# Patient Record
Sex: MALE | Race: WHITE
[De-identification: names, ages, dates, MRNs, and addresses within clinical notes are randomized per-mention and may not be internally consistent; named-entity substitution may affect disease eponyms.]

---

## 2018-01-19 NOTE — HP
ATTENDING PHYSICIAN:  Dr. Tatum.

 

CONSULTING PHYSICIAN:  Dr. Fredy Shook.

 

DATE OF SERVICE:  01/19/2018

 

CHIEF COMPLAINT:  Evaluation of right knee pain.

 

HISTORY OF PRESENT ILLNESS:  Mr. Greenberg is a 77-year-old male, who fell onto 
his right knee while seated on the commode this morning.  He has a history of 
multiple CVAs and has profound right-sided weakness.  He has a history of falls 
and injury to his right leg including previous fractures as a result. His wife, 
who was helping him toilet, witnessed the fall.  He denies hitting his head or 
suffering any loss of consciousness.  He took a hydrocodone tablet at home and 
then was given morphine in the ED.  He reports that his pain is currently 6/10.
  He describes the pain as constant and sharp.  It is confined to the knee and 
does not radiate.  He has no other complaints.

 

PAST MEDICAL HISTORY:  The patient reports having two strokes, one in 2009 and 
one in 2013.

 

PAST MEDICAL HISTORY:  Also significant for hypertension, hyperlipidemia, 
diabetes, depression, history of polio, history of chronic kidney disease, and 
history of primary pulmonary hypertension.

 

PAST SURGICAL HISTORY:  Significant for appendectomy in the 1980s.

 

FAMILY HISTORY:  Sister, coronary artery disease.  Mother, coronary artery 
disease.  Father, coronary artery disease.

 

SOCIAL HISTORY:  He does not smoke.  He does not drink alcohol.  He does not do 
drugs.

 

ALLERGIES:  No known drug allergies.

 

REVIEW OF SYSTEMS:  Ten-point review of systems negative except as mentioned in 
the HPI.

 

CURRENT MEDICATIONS:  Actos 45 mg p.o. once daily, amlodipine 10 mg p.o. daily, 
fluoxetine 10 mg p.o. daily, pravastatin 40 mg p.o. daily, lisinopril 10 mg 
p.o. daily.

 

OBJECTIVE:

VITAL SIGNS:  /96, pulse 69, respirations 18, temperature 97.7, and O2 
sat 97% on room air.  Repeat vital signs:  As follows, /75, pulse 65, 
respirations 18, O2 sat 97% on room air.

GENERAL:  Elderly male lying in bed.  He does not appear in pain.  He appears 
nontoxic.

HEENT:  Normocephalic, atraumatic.  Eyes Pupils equal, round, and reactive to 
light and accommodation.  Extraocular movements intact.  Ears atraumatic.  
External auditory canals are clear.  Nose:  Nares are patent and free of blood.
  Mouth:  Oropharynx is clear.

NECK:  Trachea is midline.  He has no tenderness.

RESPIRATORY:  Clear to auscultation bilaterally.

CARDIOVASCULAR:  He has a grade out 2/6 systolic murmur heard best on the left 
sternal border.  He has a regular rate and rhythm.  His distal pulses are 2+ 
bilaterally.

MUSCULOSKELETAL:  His  strength and great toe strength are 5/5 on his left 
side.  Strength 1/5 on the right.

EXTREMITIES:  His right knee shows gross effusion.  No erythema, no warmth.  
General tenderness to palpation.

SKIN:  Warm and dry and well perfused.  There are scattered 1-2 mm abrasions 
across his left knee and distal right lower extremity.

NEUROLOGIC:  He is alert and oriented x4.  His [place] coma score is 15.  He 
denies numbness or tingling in his extremities.

PSYCHIATRIC:  His mood and affect are appropriate.

 

RADIOLOGIC FINDINGS:  Right knee 3 view x-ray shows comminuted intra-articular 
fracture of the distal femur.  He also has an acute lipohemarthrosis as well as 
osteoporosis and atherosclerosis that are longstanding.

 

ASSESSMENT AND PLAN:

1.  Status post ground level fall.

2.  Comminuted distal femur fracture.

3.  Hypertension.

4.  Diabetes.

5.  Hyperlipidemia.

6.  Depression.

7.  History of stroke.

 

PLAN:  He has seen Dr. Shook from ortho surgery who recommends nonsurgical 
management, given that limb is not weightbearing.  The patient reports that he 
has a hinged knee brace that he is worn with prior fractures and Dr. Shook 
has recommended that he wear this.  We will admit him for pain management 
tonight and then transfer him to rehab tomorrow.  PT and OT consult will be 
placed as well as a rehab evaluation.  The patient has been seen and examined 
along with Dr. Tatum, who agrees with the assessment and plan.

 

ERNIE

## 2018-01-19 NOTE — PRG
DATE OF SERVICE:  01/19/2018

 

SUBJECTIVE:  This is a 77-year-old male status post fall with distal femur fracture.  Per orthopedic 
surgery, fracture can be treated nonoperatively.  An attempt was made to discharge the patient from [facility] emergency room to inpatient rehabilitation, but there are no beds.  The patient was admitted to [facility] for pain control, PT and OT and rehab consult.  Upon my evaluation, he vocalized no compla
int.

 

OBJECTIVE:

VITAL SIGNS:  Reviewed and stable.

GENERAL:  The patient is resting in bed in no acute distress.  Breathing is nonlabored.

 

ASSESSMENT AND PLAN:  As documented in history and physical.  Continue care as ordered.  Continue to 
monitor.

## 2018-01-19 NOTE — RAD
RIGHT KNEE THREE VIEWS:

 

HISTORY:

Fall.  Right knee injury.

 

FINDINGS:

Comminuted, predominantly oblique fracture through the medial aspect of the distal femur extends from
 the medial metaphysis to the intercondylar fossa.  There is 1.2 cm posterior medial displacement of 
the major fragment.  There is angulation of the proximal tibia present with the appearance of an acut
e impaction fracture involving the medial tibial metaphysis.  No tibial plateau fracture is apparent.
  No fibular head fracture is reliably demonstrated.  Fat and fluid distend the suprapatellar bursa. 
 Osseous structures are markedly demineralized.  There is calcification in the arterial structures.

 

IMPRESSION:

1.  Comminuted intraarticular fracture of the distal femur, as detailed above.  Resultant lipohemarth
rosis.

 

2.  Osteoporosis.

 

3.  Atherosclerosis.

 

POS: [facility]

## 2018-01-19 NOTE — CON
DATE OF CONSULTATION:  01/19/2018

 

REQUESTING PHYSICIAN:  Dr. Jamal Tatum.

 

BRIEF HISTORY OF PRESENT ILLNESS:  The patient is a 77-year-old gentleman with a history of prior str
trang and right-sided paralysis.  The patient also has a history of hypertension, diabetes, hyperlipide
tali.  He has had multiple falls in the past with a hip fracture in May of last year, which resulted i
n a healed fracture with nonsurgical management.  Earlier today, he fell from a toilet and sustained 
injury again to this right lower extremity.  Upon arrival at [facility], x-rays were obtaine
d and showed a medial distal femoral condyle fracture that extends into the joint surface with mild d
isplacement.  Patient also has evidence of a healing older tibial plateau fracture.  With this new in
jury, orthopedic consultation requested.  Patient reports the pain level is 7/10.

 

PAST MEDICAL HISTORY:  Remarkable for cerebrovascular accident in 07/2013, hypertension, diabetes, re
nal insufficiency, and gout.

 

PAST SURGICAL HISTORY:  Includes appendectomy.

 

SOCIAL HISTORY:  The patient denies alcohol or drug use and does not have a history of smoking.

 

REVIEW OF SYSTEMS:  Denies recent fevers, chills, or sweats.  Denies current chest pain or shortness 
of breath.  He does have some mild stocking dysesthesias distally.

 

ALLERGIES:  None known.

 

MEDICATIONS:  Per his hospital record, remarkable for pravastatin, metoprolol, Norvasc, Actos, aspiri
n daily.

 

PHYSICAL EXAMINATION:

HEENT:  Poor dentition, but otherwise atraumatic.

HEART:  Shows a regular rate and rhythm without murmur.

LUNGS:  Clear to auscultation with good breath sounds.

ABDOMEN:  Benign.

EXTREMITIES:  Remarkable for right lower extremity with swelling at the knee with the knee held in fl
exion of approximately 35 degrees with little to no motion at the knee.  He does have pain to palpati
on at the medial condyle.  The calf is soft and nontender.  He does have dysesthesias in this extremi
ty secondary to both stroke as well as history of diabetes.  The hip is held in a slightly externally
 rotated posture.  There are no breaks in skin.

 

X-ray, two view x-ray of the knee remarkable for a medial condyle fracture of the distal femur with s
ome displacement and although there is relatively good preservation of oral alignment.  He was also f
ound to have severe patella baja as well as what appears to be sclerosis of the proximal tibial metap
hyseal region consistent with a subacute fracture of the plateau.

 

ASSESSMENT:  A 77-year-old gentleman, status post fall sustaining a mildly displaced distal femur fra
cture.

 

PLAN:  At this point in time, I would proceed with nonsurgical management given that this is a limb t
hat is not weightbearing.  He was able to heal his intertrochanteric femur fracture uneventfully with
 nonsurgical management.  Patient reports that he does have a hinged knee brace, which he has used fo
r his prior fracture of the proximal tibia; however, it was not wearing his hinged brace today.  I wo
uld like him to go back into the hinged knee brace and have a locked in position of comfort.  We will
 then see the patient follow up in the office in 2-3 weeks for recheck and follow up x-ray.  The dionisio
ent appears comfortable with this discussion and plan.

## 2018-01-20 NOTE — PRG
DATE OF SERVICE:  01/20/2018

 

SUBJECTIVE:  The patient is hospital day #2 status post ground level fall which he fell on his right 
knee.  Of note, the patient is hemiplegic and his right side of his affected side.  The patient broug
ht to the emergency department, evaluated, examined and noted to have a minimally displaced right dis
oleksandr femur fracture.  After evaluation by Orthopedics, it was determined this could be managed nonoper
atively in view of a nonweightbearing status on that side.  The patient is currently awaiting placeme
nt into rehabilitation overnight and his pain was controlled.  He is tolerating a diet and has no cur
rent complaints.

 

PHYSICAL EXAMINATION:

CURRENT VITAL SIGNS:  Temperature is 98.3, heart rate 77, blood pressure 127/66, respirations 16, oxy
gen saturation is 92% on room air.

GENERAL:  Patient is resting in bed.  He is awake, responsive and at baseline.

HEENT:  Unremarkable.

LUNGS:  Clear to auscultation bilaterally.

HEART:  Regular rate and rhythm.

ABDOMEN:  Soft, flat, and nontender with active bowel sounds.

EXTREMITIES:  Show capillary refill less than 3 seconds.  Pulses 2+ x4.

 

LABORATORY DATA:  White blood cell count 8.6, hemoglobin 9.1, hematocrit 29.1, and platelet 150.  Sod
ium 139, potassium 4.5, chloride 109, CO2 of 24, BUN 33, creatinine 1.74, glucose 150, magnesium 1.9,
 and phosphorus 3.8.

 

ASSESSMENT AND PLAN:

1.  Status post mechanical fall.

2.  Right distal femur fracture.

3.  Right-sided hemiplegia.

 

PLAN:  Will be to continue physical and occupational therapy and await placement.

 

This case was discussed with Dr. Bautista this morning during rounds.

## 2018-01-20 NOTE — PRG
DATE OF SERVICE:  01/20/2018

 

SUBJECTIVE:  This is a 77-year-old male status post fall with a periprosthetic distal femur fracture.
  The patient is currently awaiting placement into inpatient rehabilitation.  His pain is controlled.
  He is tolerating a diet.  Upon my evaluation, the patient vocalized no complaints.

 

OBJECTIVE:

VITAL SIGNS:  Reviewed and stable.

GENERAL:  The patient is resting in bed in no acute distress.

RESPIRATORY:  Breathing is nonlabored.

 

ASSESSMENT AND PLAN:  As documented in daily progress note.  Continue care as ordered.  Continue to m
onitor.

## 2018-01-21 NOTE — PRG
DATE OF SERVICE:  01/21/2018

 

SUBJECTIVE:  The patient is status post fall.  The patient was being transferred from his bed to the 
bedside commode.  The patient is a long time hemiplegic.  When transferring, ended up dropping him on
 his right knee.  The patient sustained a distal femur fracture due to his hemiplegia and it would be
 managed nonoperatively.  The patient has been awaiting placement here in the facility.  His pain is 
controlled.  He has been working with physical and occupational therapy.  We were informed today that
 rehabilitation had denied him with the reason being it did not appear that patient will be able to t
olerate 3 hours a day of physical therapy and the recommendation that the patient be evaluated for [facility].

 

PHYSICAL EXAMINATION:

VITAL SIGNS:  Temperature 98.3, heart rate 51, blood pressure 131/69, respirations 14, oxygen saturat
ion 92% on room air.

GENERAL:  The patient is resting comfortably in bed.  He is awake, alert, responsive.

HEENT:  Unremarkable.

LUNGS:  Chest is clear to auscultation bilaterally with good inspiratory and expiratory effort.

HEART:  Regular rate and rhythm.

ABDOMEN:  Soft, flat, nontender with active bowel sounds.

EXTREMITIES:  Neurovascularly intact x4 and consistent with his baseline hemiplegia.

 

LABORATORY DATA:  There are no labs or radiographs to review this morning.

 

ASSESSMENT AND PLAN:

1.  Status post mechanical fall.

2.  Distal femur fracture.

3.  Hemiplegia secondary to stroke.

 

Plan will be to continue supportive care, physical and occupational therapy and await placement decis
ion.  The case was discussed with the  who will start working on a skilled nursing facili
ty for the patient.  This case was discussed with Dr. Bautista this morning.

## 2018-01-21 NOTE — PRG
DATE OF SERVICE:  01/21/2018

 

SUBJECTIVE:  Mr. Tatum is a 77-year-old male status post fall and distal periprosthetic femur fractu
re.  He was denied by inpatient rehab earlier today.  Plans have been made to try and have the patien
t placed in skilled nursing.  Upon my evaluation, he vocalized no complaints this evening.  He has be
en working with PT and OT.  Pain is controlled via p.r.n. analgesics.

 

OBJECTIVE:

VITAL SIGNS:  Reviewed and stable.

GENERAL:  Resting in bed, no acute distress.

LUNGS:  Breathing is nonlabored.

 

ASSESSMENT AND PLAN:  As documented in daily progress note.  Continue care as ordered.  Continue to m
onitor.

## 2018-01-22 NOTE — PRG
DATE OF SERVICE:  01/22/2018

 

SUBJECTIVE:  This is a patient status post fall with distal femur/periprosthetic fracture.  This was 
treated nonoperatively.  The patient was not felt to be a candidate for inpatient rehab and is now aw
aiting placement to skilled nursing facility.  Upon my evaluation, the patient vocalized no complaint
.

 

OBJECTIVE:

VITAL SIGNS:  Reviewed and stable.

GENERAL:  The patient is resting in bed, in no acute distress.

 

Physical exam unchanged from earlier today.

 

ASSESSMENT AND PLAN:  As documented in daily progress note.  Continue care as ordered.  Continue to m
onitor.  Await eventual disposition.

## 2018-01-22 NOTE — PRG
DATE OF SERVICE:  01/22/2018

 

SUBJECTIVE:  The patient is status post ground level fall where he sustained a distal femur fracture 
on his hemiplegic side.  The patient was admitted to the hospital originally awaiting placement to re
[facility], but after evaluation, it was decided that he was unlikely be able to participate in 3 hours of p
hysical therapy a day, so recommendation was for him to place in a skilled nursing facility, this pro
cess has been started.  This morning, the patient's wife expressed some extreme displeasure with the 
process and was explained to her that, originally the patient was to be admitted to the rehab facilit
y from the emergency room, but was unable to because of bed availability and after further evaluation
, it was noted the patient was unlikely to be able to participate, so his skilled facility was recomm
ended and that the process was started.  Wife appeared to be less unhappy about this answer and we al
so directed the floor directors to talk with her to ensure that there were no other issues.

 

OBJECTIVE:

VITAL SIGNS:  Temperature 97.5, heart rate 77, respirations 14, oxygen saturation is 90% on room air,
 blood pressure 119/76.

GENERAL:  The patient is resting comfortably in bed.  He is awake and responds to verbal stimuli and 
follows verbal commands appropriately.

LUNGS:  Clear to auscultation bilaterally.

HEART:  Regular rate and rhythm.

ABDOMEN:  Soft, flat, and nontender with active bowel sounds.

EXTREMITIES:  Show pulses 2+ and capillary refill less than 3 seconds.

 

LABORATORY DATA:  No labs or radiographs reviewed this morning.

 

ASSESSMENT AND PLAN:

1.  Status post mechanical fall.

2.  Right distal femur fracture that will be treated nonoperatively.

3.  Hemiplegia.

 

Plan will be to continue physical and occupational therapy, pain control and await a skilled nursing 
facility placement.

 

This patient was evaluated and examined with Dr. Bautista this morning during rounds.

## 2018-01-23 NOTE — PRG
DATE OF SERVICE: 1/23/2018

 

SUBJECTIVE:  Patient is status post fall with distal femur periprosthetic fracture, this was treated 
nonoperatively.  Patient is not felt to be is a candidate for inpatient rehab.  He is now awaiting pl
acing to skilled nursing facility at this time; it looks like it will be tomorrow in a.m.

 

OBJECTIVE:  Vital signs are stable.  Physical examination unchanged.

 

ASSESSMENT AND PLAN:  It was documented in daily progress note.  Continue care as ordered.  Continue 
to monitor.  Await disposition tomorrow in the a.m.

## 2018-01-23 NOTE — PRG
DATE OF SERVICE:  01/23/2018

 

ATTENDING PHYSICIAN:  Mike Bautista, DO

 

This is Maya Baum, nurse practitioner, dictating a daily progress note for 
Dr. Mike Bautista.

 

SUBJECTIVE:  The patient is status post ground level fall with right distal 
femur fracture.  It was elected to treat the patient nonoperatively as the 
fracture is on the side with hemiplegia.  The patient was admitted to the 
hospital originally awaiting placement to rehab, but after evaluation skilled 
nursing facility was recommended.  This process for referral and acceptance in 
skilled nursing facility has been started.  Case management continues to 
follow.  This morning, the patient was seen on rounds lying in bed.  He denies 
pain.

 

OBJECTIVE:

VITAL SIGNS:  Temperature 98.3, pulse 54, blood pressure is 112/66, 
respirations 18, O2 sat 93% on room air.

GENERAL:  The patient is resting comfortably in bed.  He follows commands.  No 
acute distress.

PULMONARY:  Bilateral breath sounds clear.  No respiratory distress.

CARDIOVASCULAR:  Regular rate and rhythm.  Heart sounds normal.

ABDOMEN:  Soft, flat, nontender, nondistended.

EXTREMITIES:  Pulses 2+ in all extremities.  Neurovascularly intact.  Cap 
refill brisk.

 

LABORATORY DATA:  There is no laboratory data for today.

 

ASSESSMENT:

1.  Status post ground level fall.

2.  Right distal femur fracture, nonoperative treatment.

3.  Status post multiple cerebrovascular accidents with right hemiplegia.

 

PLAN:

1.  Continue mobilizing with physical and occupational therapy.

2.  Staff to assist the patient with ADLs and eating.  This has been discussed 
with  and staff nurse.

3.  Case management consult for discharge planning.

4.  Continue oral analgesia.

5.  Anticipate the patient will be discharged to [facility].  Plan approval 
granted.

6.  Lovenox for DVT prophylaxis.

7.  Bowel protocol.

8.  Home medicines restarted.

 

The patient was seen and examined with Dr. Bautista, attending trauma surgeon, who 
agrees with the assessment and plan.

 

[facility]

## 2018-01-24 NOTE — DIS
DATE OF ADMISSION:  01/19/2018

 

DATE OF DISCHARGE:  01/24/2017

 

ADMITTING PHYSICIAN:  Dr. Jamal Tatum.

 

CONSULTING PHYSICIAN:  Dr. Fredy Shook, Orthopedics.

 

REASON FOR HOSPITALIZATION:  Fall with right knee pain.

 

HOSPITAL DIAGNOSIS:  Mildly displaced right femur fracture.

 

PROCEDURES:  None.

 

DISCHARGE CONDITION:  Stable, to [facility].

 

BRIEF HISTORY OF HOSPITALIZATION:  Mr. Greenberg is a 77-year-old male who fell 
off the commode on the morning of admission.  He was transported to [facility] where in the ED a right mildly displaced distal femur fracture was 
identified.  He was admitted to the hospital by Trauma Services.  Dr. Fredy Shook, Orthopedics was consulted.  Dr. Shook recommended nonsurgical 
management as the patient has had previous multiple CVAs and the right lower 
extremity is nonweightbearing.  He was placed in a hinged knee brace.  He began 
mobilizing with physical and occupational therapy.  Case management was 
consulted for discharge planning.  The patient and patient's wife chose to go 
to [facility].  It is not necessary for the patient 
to follow up with Trauma Services.  A urinalysis was performed while in the 
hospital.  He was noted to have urine glucose of 250.  Hemoglobin A1c was 
performed.  It was noted to be 5.9.  This may be followed at [facility] nursing 
[facility] by an Internal Medicine doctor.  He is to follow up with Dr. Shook 
in 3 weeks with repeat x-rays.

 

The patient was seen and examined with Dr. Bautista who agrees with the assessment 
and discharge plan.

 

[facility]

## 2019-03-21 NOTE — RAD
2 VIEWS ABDOMEN AND UPRIGHT VIEW OF CHEST:

 

Date:  03/21/19 

 

COMPARISON:  

None. 

 

HISTORY:  

Abdominal swelling and distention. 

 

FINDINGS:

Supine and upright views of the abdomen, and upright view of the chest shows a nonspecific, nonobstru
ctive bowel gas pattern. Air is seen throughout the colon to the level of the rectum. No free air or 
air fluid levels are seen on upright examination. 

 

The heart is normal in size without focal cardiac abnormality. Atherosclerotic calcifications are see
n in the aorta. There is no evidence of consolidation, mass, or pleural effusion. 

 

IMPRESSION: 

Nonobstructive bowel gas pattern. 

 

 

POS: [facility]

## 2020-01-23 NOTE — RAD
Exam:3 views right knee



HISTORY: Pain



COMPARISON: 1/9/2018



FINDINGS: Diffuse bony demineralization. Presumed chronic changes in the distal femur due to remote i
njury. Definite acute fracture is not appreciated. Irregularity involving the proximal tibia is

also presumed be due to remote insult. Definite acute fracture is not appreciated. There is mild tric
ompartmental degenerative change. Trace suprapatellar effusion. There does appear to be soft tissue

swelling. Remote injury to the proximal fibula is noted. Extensive atherosclerosis..



IMPRESSION: 

1. Presumed remote injury to the distal femur, proximal tibia and proximal fibula. Definite acute fra
cture is not appreciated. Small joint effusion. Fractures involving the distal femur, proximal

tibia and fibular better demonstrated on the examination from January 2018



Transcribed Date/Time: 1/23/2020 7:55 PM



Reported By: Dio Mortensen 

Electronically Signed:  1/23/2020 8:05 PM

## 2020-01-23 NOTE — ULT
Exam: Right lower extremity venous ultrasound with Doppler



HISTORY: Pain.



COMPARISON: None



TECHNIQUE: Grayscale, color flow, Doppler imaging and spectral waveform is performed in the right low
er extremity venous system



FINDINGS: Compressibility, presence of flow and augmentation in the common femoral vein, femoral vein
, popliteal vein. Flow in the posterior tibial vein. Flow the greater saphenous vein and profunda

femoral vein



IMPRESSION: No evidence of thrombus in the right lower extremity deep venous system



Reported By: Dio Mortensen 

Electronically Signed:  1/23/2020 7:13 PM

## 2020-01-23 NOTE — RAD
Exam:Right foot 2 views



HISTORY: Pain



COMPARISON: None



FINDINGS: Diffuse bone demineralization. Multiarticular degenerative change. No obvious fractures. Li
mited evaluation in terms of the Lisfranc alignment. Mild hypertrophy of the calcaneus at the

plantar aponeurosis is noted. No significant soft tissue swelling or definite subcutaneous emphysema.


No erosive or destructive changes.

Atherosclerosis



IMPRESSION: As above. If there is concern for osteomyelitis, MRI can be performed. No obvious fractur
e.



Transcribed Date/Time: 1/23/2020 7:52 PM



Reported By: Dio Mortensen 

Electronically Signed:  1/23/2020 8:06 PM

## 2020-01-23 NOTE — RAD
Exam:Right ankle 3 views



HISTORY: Pain.



COMPARISON: None



FINDINGS: Diffuse bone demineralization. No definite erosive or destructive changes. No joint effusio
n or soft tissue swelling. Atherosclerosis is noted.



IMPRESSION: No definite fracture.



Reported By: Dio Mortensen 

Electronically Signed:  1/23/2020 7:17 PM

## 2020-09-10 NOTE — CT
CT ABDOMEN NONCONTRAST

CT PELVIS NONCONTRAST:

(Urolithiasis protocol)



DATE:

9/10/2020



HISTORY:

79-year-old male with right sided abdominal distention. Possible right abdominal hernia.



COMPARISON:

None



TECHNIQUE:

IV injection of iodinated contrast media: None

Oral contrast media: None



FINDINGS:

Other than for urolithiasis, the lack of IV and oral contrast limits the evaluation.



There is edema in the subcutaneous fat of the right flank. There is mild edema elsewhere in the subcu
taneous fat of the contralateral left flank, and posteriorly at midline.

Nonspecific groundglass changes at bilateral lung bases, chronic versus acute. Broad band of atelecta
sis at left lower lobe broadly abutting left posterior pleural surface.

No ascites, pneumoperitoneum, small bowel dilation, or colonic diverticulitis.

Large volume of stool in the sigmoid colon and especially rectum.

Rectum is distended to 6.3 x 6.9 cm. Questionable pneumatosis of the rectum. Presacral fat stranding 
representing edema posterior to the rectum.

Severe, diffuse osteopenia, generalized.

Bilateral L5 pars interarticularis defects. Mild grade 1 anterolisthesis of L5 on S1. No compression 
fracture of lumbar spine. 

Old right intertrochanteric healed fracture deformity. 

S-shaped scoliosis.

No hydronephrosis.

No renal, ureteral, or bladder calculus.

At least mildly enlarged prostate gland invaginating bladder base.

The rest of the bladder walls are of normal thickness.

Several round exophytic masses protruding from cortical surfaces of the bilateral kidneys, some with 
higher attenuation. At least some and probably most of them are probably cysts, with the higher

density masses probably representing hemorrhagic renal cysts. However, without IV contrast, it is dif
ficult to rule out renal cell carcinoma. There is a approximately 2.3 x 1.7 x 1.3 cm lesion at the

lateral aspect of the right renal midpole with heterogeneous density, average density of 13 Hounsfiel
d units, which could also be a nonexophytic hemorrhagic renal cyst.

Heavy atherosclerotic calcification of abdominal aorta and its branches, including iliac arteries.

No hepatosplenomegaly.

No abdominal aortic aneurysm.

Atrophic pancreas.

Old suture line in subcutaneous fat and at very thin right anterior abdominal wall indicating prior s
urgery.

No spigelian, inguinal, or ventral hernia. 

Appendix not identified with certainty.

Atrophy of right gluteus musculature, right psoas muscle, right iliac is muscle.



IMPRESSION:

1) no bowel containing hernia.

2) subcutaneous edema of the flanks, especially right flank.

3.) Atrophy of right psoas muscle, right iliac is muscle, and right gluteal musculature.

4.) Constipation. Questionable stercoral proctitis

5) old, healed intertrochanteric fracture of right proximal femur.

6) severe osteoporosis

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

9) enlarged prostate

10) no urolithiasis or obstructive uropathy



Reported By: Christopher Thomas 

Electronically Signed:  9/10/2020 8:55 PM

## 2022-06-08 ENCOUNTER — HOSPITAL ENCOUNTER (OUTPATIENT)
Dept: HOSPITAL 92 - ERS | Age: 82
Setting detail: OBSERVATION
LOS: 2 days | Discharge: NURSING, REHAB OR LONG-TERM CARE | End: 2022-06-10
Attending: INTERNAL MEDICINE | Admitting: INTERNAL MEDICINE
Payer: MEDICARE

## 2022-06-08 VITALS — BODY MASS INDEX: 25.4 KG/M2

## 2022-06-08 DIAGNOSIS — R13.10: ICD-10-CM

## 2022-06-08 DIAGNOSIS — I10: ICD-10-CM

## 2022-06-08 DIAGNOSIS — Z79.899: ICD-10-CM

## 2022-06-08 DIAGNOSIS — I69.351: ICD-10-CM

## 2022-06-08 DIAGNOSIS — D53.9: ICD-10-CM

## 2022-06-08 DIAGNOSIS — R59.0: ICD-10-CM

## 2022-06-08 DIAGNOSIS — Z20.822: ICD-10-CM

## 2022-06-08 DIAGNOSIS — E78.5: ICD-10-CM

## 2022-06-08 DIAGNOSIS — Z79.82: ICD-10-CM

## 2022-06-08 DIAGNOSIS — I69.391: ICD-10-CM

## 2022-06-08 DIAGNOSIS — I69.322: ICD-10-CM

## 2022-06-08 DIAGNOSIS — I27.20: ICD-10-CM

## 2022-06-08 DIAGNOSIS — Z86.12: ICD-10-CM

## 2022-06-08 DIAGNOSIS — I48.91: ICD-10-CM

## 2022-06-08 DIAGNOSIS — Z79.84: ICD-10-CM

## 2022-06-08 DIAGNOSIS — I34.0: ICD-10-CM

## 2022-06-08 DIAGNOSIS — J90: Primary | ICD-10-CM

## 2022-06-08 DIAGNOSIS — E11.42: ICD-10-CM

## 2022-06-08 LAB
ALBUMIN SERPL BCG-MCNC: 2.5 G/DL (ref 3.4–4.8)
ALP SERPL-CCNC: 102 U/L (ref 40–110)
ALT SERPL W P-5'-P-CCNC: 8 U/L (ref 8–55)
ANION GAP SERPL CALC-SCNC: 11 MMOL/L (ref 10–20)
AST SERPL-CCNC: 12 U/L (ref 5–34)
BASOPHILS # BLD AUTO: 0 THOU/UL (ref 0–0.2)
BASOPHILS NFR BLD AUTO: 0.4 % (ref 0–1)
BILIRUB SERPL-MCNC: 0.7 MG/DL (ref 0.2–1.2)
BUN SERPL-MCNC: 30 MG/DL (ref 8.4–25.7)
CALCIUM SERPL-MCNC: 8 MG/DL (ref 7.8–10.44)
CHLORIDE SERPL-SCNC: 107 MMOL/L (ref 98–107)
CO2 SERPL-SCNC: 24 MMOL/L (ref 23–31)
CREAT CL PREDICTED SERPL C-G-VRATE: 0 ML/MIN (ref 70–130)
EOSINOPHIL # BLD AUTO: 0.5 THOU/UL (ref 0–0.7)
EOSINOPHIL NFR BLD AUTO: 7.2 % (ref 0–10)
GLOBULIN SER CALC-MCNC: 3.4 G/DL (ref 2.4–3.5)
GLUCOSE SERPL-MCNC: 102 MG/DL (ref 83–110)
HGB BLD-MCNC: 9.8 G/DL (ref 14–18)
LYMPHOCYTES # BLD: 1.1 THOU/UL (ref 1.2–3.4)
LYMPHOCYTES NFR BLD AUTO: 15.5 % (ref 21–51)
MCH RBC QN AUTO: 32.5 PG (ref 27–31)
MCV RBC AUTO: 101 FL (ref 78–98)
MONOCYTES # BLD AUTO: 0.5 THOU/UL (ref 0.11–0.59)
MONOCYTES NFR BLD AUTO: 6.8 % (ref 0–10)
NEUTROPHILS # BLD AUTO: 5.1 THOU/UL (ref 1.4–6.5)
NEUTROPHILS NFR BLD AUTO: 70.2 % (ref 42–75)
PLATELET # BLD AUTO: 156 THOU/UL (ref 130–400)
POTASSIUM SERPL-SCNC: 4.1 MMOL/L (ref 3.5–5.1)
RBC # BLD AUTO: 3.01 MILL/UL (ref 4.7–6.1)
SODIUM SERPL-SCNC: 138 MMOL/L (ref 136–145)
TROPONIN I SERPL DL<=0.01 NG/ML-MCNC: 0.01 NG/ML (ref ?–0.03)
TROPONIN I SERPL DL<=0.01 NG/ML-MCNC: 0.02 NG/ML (ref ?–0.03)
WBC # BLD AUTO: 7.2 THOU/UL (ref 4.8–10.8)

## 2022-06-08 PROCEDURE — 89051 BODY FLUID CELL COUNT: CPT

## 2022-06-08 PROCEDURE — 87206 SMEAR FLUORESCENT/ACID STAI: CPT

## 2022-06-08 PROCEDURE — 82607 VITAMIN B-12: CPT

## 2022-06-08 PROCEDURE — 83615 LACTATE (LD) (LDH) ENZYME: CPT

## 2022-06-08 PROCEDURE — 71275 CT ANGIOGRAPHY CHEST: CPT

## 2022-06-08 PROCEDURE — 83880 ASSAY OF NATRIURETIC PEPTIDE: CPT

## 2022-06-08 PROCEDURE — 80048 BASIC METABOLIC PNL TOTAL CA: CPT

## 2022-06-08 PROCEDURE — 99285 EMERGENCY DEPT VISIT HI MDM: CPT

## 2022-06-08 PROCEDURE — 85025 COMPLETE CBC W/AUTO DIFF WBC: CPT

## 2022-06-08 PROCEDURE — 88341 IMHCHEM/IMCYTCHM EA ADD ANTB: CPT

## 2022-06-08 PROCEDURE — 88342 IMHCHEM/IMCYTCHM 1ST ANTB: CPT

## 2022-06-08 PROCEDURE — 36415 COLL VENOUS BLD VENIPUNCTURE: CPT

## 2022-06-08 PROCEDURE — 93306 TTE W/DOPPLER COMPLETE: CPT

## 2022-06-08 PROCEDURE — 83036 HEMOGLOBIN GLYCOSYLATED A1C: CPT

## 2022-06-08 PROCEDURE — 85046 RETICYTE/HGB CONCENTRATE: CPT

## 2022-06-08 PROCEDURE — 82945 GLUCOSE OTHER FLUID: CPT

## 2022-06-08 PROCEDURE — U0005 INFEC AGEN DETEC AMPLI PROBE: HCPCS

## 2022-06-08 PROCEDURE — 71046 X-RAY EXAM CHEST 2 VIEWS: CPT

## 2022-06-08 PROCEDURE — 85730 THROMBOPLASTIN TIME PARTIAL: CPT

## 2022-06-08 PROCEDURE — 88305 TISSUE EXAM BY PATHOLOGIST: CPT

## 2022-06-08 PROCEDURE — 88112 CYTOPATH CELL ENHANCE TECH: CPT

## 2022-06-08 PROCEDURE — 85610 PROTHROMBIN TIME: CPT

## 2022-06-08 PROCEDURE — 0W9B3ZX DRAINAGE OF LEFT PLEURAL CAVITY, PERCUTANEOUS APPROACH, DIAGNOSTIC: ICD-10-PCS | Performed by: RADIOLOGY

## 2022-06-08 PROCEDURE — G0378 HOSPITAL OBSERVATION PER HR: HCPCS

## 2022-06-08 PROCEDURE — 82962 GLUCOSE BLOOD TEST: CPT

## 2022-06-08 PROCEDURE — 80053 COMPREHEN METABOLIC PANEL: CPT

## 2022-06-08 PROCEDURE — 87116 MYCOBACTERIA CULTURE: CPT

## 2022-06-08 PROCEDURE — 93005 ELECTROCARDIOGRAM TRACING: CPT

## 2022-06-08 PROCEDURE — 71045 X-RAY EXAM CHEST 1 VIEW: CPT

## 2022-06-08 PROCEDURE — 83550 IRON BINDING TEST: CPT

## 2022-06-08 PROCEDURE — 82746 ASSAY OF FOLIC ACID SERUM: CPT

## 2022-06-08 PROCEDURE — 32555 ASPIRATE PLEURA W/ IMAGING: CPT

## 2022-06-08 PROCEDURE — U0003 INFECTIOUS AGENT DETECTION BY NUCLEIC ACID (DNA OR RNA); SEVERE ACUTE RESPIRATORY SYNDROME CORONAVIRUS 2 (SARS-COV-2) (CORONAVIRUS DISEASE [COVID-19]), AMPLIFIED PROBE TECHNIQUE, MAKING USE OF HIGH THROUGHPUT TECHNOLOGIES AS DESCRIBED BY CMS-2020-01-R: HCPCS

## 2022-06-08 PROCEDURE — 85060 BLOOD SMEAR INTERPRETATION: CPT

## 2022-06-08 PROCEDURE — 84484 ASSAY OF TROPONIN QUANT: CPT

## 2022-06-08 PROCEDURE — 87205 SMEAR GRAM STAIN: CPT

## 2022-06-08 PROCEDURE — 36416 COLLJ CAPILLARY BLOOD SPEC: CPT

## 2022-06-08 PROCEDURE — 84157 ASSAY OF PROTEIN OTHER: CPT

## 2022-06-08 PROCEDURE — 82150 ASSAY OF AMYLASE: CPT

## 2022-06-08 PROCEDURE — 87070 CULTURE OTHR SPECIMN AEROBIC: CPT

## 2022-06-08 PROCEDURE — 84478 ASSAY OF TRIGLYCERIDES: CPT

## 2022-06-08 PROCEDURE — 83540 ASSAY OF IRON: CPT

## 2022-06-09 LAB
ALBUMIN SERPL BCG-MCNC: 2.4 G/DL (ref 3.4–4.8)
ALP SERPL-CCNC: 102 U/L (ref 40–110)
ALT SERPL W P-5'-P-CCNC: 8 U/L (ref 8–55)
AMYLASE PLR-CCNC: (no result) U/L
ANION GAP SERPL CALC-SCNC: 11 MMOL/L (ref 10–20)
ANION GAP SERPL CALC-SCNC: 8 MMOL/L (ref 10–20)
APTT PPP: 36.8 SEC (ref 22.9–36.1)
AST SERPL-CCNC: 13 U/L (ref 5–34)
BASOPHILS # BLD AUTO: 0 THOU/UL (ref 0–0.2)
BASOPHILS NFR BLD AUTO: 0.3 % (ref 0–1)
BILIRUB SERPL-MCNC: 0.7 MG/DL (ref 0.2–1.2)
BUN SERPL-MCNC: 26 MG/DL (ref 8.4–25.7)
BUN SERPL-MCNC: 28 MG/DL (ref 8.4–25.7)
CALCIUM SERPL-MCNC: 8.3 MG/DL (ref 7.8–10.44)
CALCIUM SERPL-MCNC: 8.3 MG/DL (ref 7.8–10.44)
CHLORIDE SERPL-SCNC: 106 MMOL/L (ref 98–107)
CHLORIDE SERPL-SCNC: 107 MMOL/L (ref 98–107)
CO2 SERPL-SCNC: 24 MMOL/L (ref 23–31)
CO2 SERPL-SCNC: 26 MMOL/L (ref 23–31)
CREAT CL PREDICTED SERPL C-G-VRATE: 46 ML/MIN (ref 70–130)
CREAT CL PREDICTED SERPL C-G-VRATE: 47 ML/MIN (ref 70–130)
EOSINOPHIL # BLD AUTO: 0.3 THOU/UL (ref 0–0.7)
EOSINOPHIL NFR BLD AUTO: 4.5 % (ref 0–10)
GLOBULIN SER CALC-MCNC: 3.7 G/DL (ref 2.4–3.5)
GLUCOSE PLR-MCNC: 91 MG/DL
GLUCOSE SERPL-MCNC: 82 MG/DL (ref 83–110)
GLUCOSE SERPL-MCNC: 88 MG/DL (ref 83–110)
HGB BLD-MCNC: 9 G/DL (ref 14–18)
INR PPP: 1.1
IRON SERPL-MCNC: 41 UG/DL (ref 65–175)
LDH PLR L TO P-CCNC: 82 U/L
LYMPHOCYTES # BLD: 1 THOU/UL (ref 1.2–3.4)
LYMPHOCYTES NFR BLD AUTO: 17.7 % (ref 21–51)
MCH RBC QN AUTO: 32.2 PG (ref 27–31)
MCV RBC AUTO: 101 FL (ref 78–98)
MONOCYTES # BLD AUTO: 0.5 THOU/UL (ref 0.11–0.59)
MONOCYTES NFR BLD AUTO: 9.1 % (ref 0–10)
NEUTROPHILS # BLD AUTO: 3.9 THOU/UL (ref 1.4–6.5)
NEUTROPHILS NFR BLD AUTO: 68.4 % (ref 42–75)
NEUTROPHILS NFR FLD: 5 %
NONHEMATIC CELLS NFR FLD MANUAL: 25 %
PLATELET # BLD AUTO: 146 THOU/UL (ref 130–400)
POTASSIUM SERPL-SCNC: 3.8 MMOL/L (ref 3.5–5.1)
POTASSIUM SERPL-SCNC: 4.1 MMOL/L (ref 3.5–5.1)
PROT PLR-MCNC: 3.1 G/DL
PROTHROMBIN TIME: 14.5 SEC (ref 12–14.7)
RBC # BLD AUTO: 2.78 MILL/UL (ref 4.7–6.1)
RBC # FLD AUTO: 315 /CU.MM
RETICS/RBC NFR: 1.2 % (ref 0.5–1.5)
SODIUM SERPL-SCNC: 137 MMOL/L (ref 136–145)
SODIUM SERPL-SCNC: 137 MMOL/L (ref 136–145)
TRIGL FLD-MCNC: 36 MG/DL
UIBC SERPL-MCNC: 163 MCG/DL (ref 261–462)
WBC # BLD AUTO: 5.7 THOU/UL (ref 4.8–10.8)
WBC # FLD AUTO: 22 /CU.MM

## 2022-06-10 VITALS — TEMPERATURE: 97.3 F | SYSTOLIC BLOOD PRESSURE: 111 MMHG | DIASTOLIC BLOOD PRESSURE: 57 MMHG

## 2022-06-18 ENCOUNTER — HOSPITAL ENCOUNTER (INPATIENT)
Dept: HOSPITAL 92 - ERS | Age: 82
LOS: 4 days | DRG: 871 | End: 2022-06-22
Attending: INTERNAL MEDICINE | Admitting: INTERNAL MEDICINE
Payer: MEDICARE

## 2022-06-18 VITALS — BODY MASS INDEX: 25.2 KG/M2

## 2022-06-18 DIAGNOSIS — J96.01: ICD-10-CM

## 2022-06-18 DIAGNOSIS — E86.0: ICD-10-CM

## 2022-06-18 DIAGNOSIS — E11.22: ICD-10-CM

## 2022-06-18 DIAGNOSIS — Z87.312: ICD-10-CM

## 2022-06-18 DIAGNOSIS — B96.20: ICD-10-CM

## 2022-06-18 DIAGNOSIS — N18.4: ICD-10-CM

## 2022-06-18 DIAGNOSIS — E87.1: ICD-10-CM

## 2022-06-18 DIAGNOSIS — Z79.899: ICD-10-CM

## 2022-06-18 DIAGNOSIS — R59.0: ICD-10-CM

## 2022-06-18 DIAGNOSIS — E78.5: ICD-10-CM

## 2022-06-18 DIAGNOSIS — R65.21: ICD-10-CM

## 2022-06-18 DIAGNOSIS — Z66: ICD-10-CM

## 2022-06-18 DIAGNOSIS — Z79.82: ICD-10-CM

## 2022-06-18 DIAGNOSIS — I25.10: ICD-10-CM

## 2022-06-18 DIAGNOSIS — Z86.19: ICD-10-CM

## 2022-06-18 DIAGNOSIS — I27.20: ICD-10-CM

## 2022-06-18 DIAGNOSIS — D63.1: ICD-10-CM

## 2022-06-18 DIAGNOSIS — R13.10: ICD-10-CM

## 2022-06-18 DIAGNOSIS — A41.89: Primary | ICD-10-CM

## 2022-06-18 DIAGNOSIS — Z87.81: ICD-10-CM

## 2022-06-18 DIAGNOSIS — Z86.12: ICD-10-CM

## 2022-06-18 DIAGNOSIS — N17.0: ICD-10-CM

## 2022-06-18 DIAGNOSIS — I12.9: ICD-10-CM

## 2022-06-18 DIAGNOSIS — A41.51: ICD-10-CM

## 2022-06-18 DIAGNOSIS — J69.0: ICD-10-CM

## 2022-06-18 DIAGNOSIS — Z20.822: ICD-10-CM

## 2022-06-18 DIAGNOSIS — I21.A1: ICD-10-CM

## 2022-06-18 DIAGNOSIS — D63.8: ICD-10-CM

## 2022-06-18 DIAGNOSIS — F32.A: ICD-10-CM

## 2022-06-18 DIAGNOSIS — I69.954: ICD-10-CM

## 2022-06-18 DIAGNOSIS — Z78.1: ICD-10-CM

## 2022-06-18 DIAGNOSIS — I48.0: ICD-10-CM

## 2022-06-18 DIAGNOSIS — Z79.890: ICD-10-CM

## 2022-06-18 DIAGNOSIS — J90: ICD-10-CM

## 2022-06-18 DIAGNOSIS — Z51.5: ICD-10-CM

## 2022-06-18 DIAGNOSIS — Z86.79: ICD-10-CM

## 2022-06-18 DIAGNOSIS — N39.0: ICD-10-CM

## 2022-06-18 DIAGNOSIS — Z90.49: ICD-10-CM

## 2022-06-18 DIAGNOSIS — G92.8: ICD-10-CM

## 2022-06-18 DIAGNOSIS — Z74.01: ICD-10-CM

## 2022-06-18 LAB
ALBUMIN SERPL BCG-MCNC: 2.1 G/DL (ref 3.4–4.8)
ALP SERPL-CCNC: 79 U/L (ref 40–110)
ALT SERPL W P-5'-P-CCNC: 7 U/L (ref 8–55)
ANALYZER IN CARDIO: (no result)
ANALYZER IN CARDIO: (no result)
ANION GAP SERPL CALC-SCNC: 13 MMOL/L (ref 10–20)
APAP SERPL-MCNC: 16 MCG/ML (ref 10–30)
AST SERPL-CCNC: 14 U/L (ref 5–34)
BACTERIA UR QL AUTO: (no result) HPF
BASE EXCESS STD BLDA CALC-SCNC: -3 MEQ/L
BASE EXCESS STD BLDV CALC-SCNC: -6.6 MEQ/L
BASOPHILS # BLD AUTO: 0 THOU/UL (ref 0–0.2)
BASOPHILS NFR BLD AUTO: 0 % (ref 0–1)
BILIRUB SERPL-MCNC: 0.5 MG/DL (ref 0.2–1.2)
BUN SERPL-MCNC: 49 MG/DL (ref 8.4–25.7)
CA-I BLDA-SCNC: 1.1 MMOL/L (ref 1.12–1.3)
CA-I BLDV-MCNC: 0.96 MMOL/L (ref 1.16–1.32)
CALCIUM SERPL-MCNC: 7.5 MG/DL (ref 7.8–10.44)
CHLORIDE BLDV-SCNC: 112 MMOL/L (ref 98–106)
CHLORIDE SERPL-SCNC: 104 MMOL/L (ref 98–107)
CK MB SERPL-MCNC: 2.3 NG/ML (ref 0–6.6)
CO2 SERPL-SCNC: 20 MMOL/L (ref 23–31)
CREAT CL PREDICTED SERPL C-G-VRATE: 0 ML/MIN (ref 70–130)
DRUG SCREEN CUTOFF: (no result)
EOSINOPHIL # BLD AUTO: 0 THOU/UL (ref 0–0.7)
EOSINOPHIL NFR BLD AUTO: 0.2 % (ref 0–10)
GLOBULIN SER CALC-MCNC: 2.9 G/DL (ref 2.4–3.5)
GLUCOSE SERPL-MCNC: 216 MG/DL (ref 83–110)
HCO3 BLDA-SCNC: 21 MEQ/L (ref 22–28)
HCO3 BLDV-SCNC: 20 MEQ/L (ref 22–28)
HCT VFR BLDA CALC: 31 % (ref 42–52)
HCT VFR BLDV CALC: 24 % (ref 42–52)
HGB BLD-MCNC: 8.7 G/DL (ref 14–18)
HGB BLDA-MCNC: 10.4 G/DL (ref 14–18)
HGB BLDV-MCNC: 8.1 G/DL (ref 12.6–17.4)
LEUKOCYTE ESTERASE UR QL STRIP.AUTO: 500 LEU/UL
LYMPHOCYTES # BLD: 0.6 THOU/UL (ref 1.2–3.4)
LYMPHOCYTES NFR BLD AUTO: 5.6 % (ref 21–51)
MCH RBC QN AUTO: 31.7 PG (ref 27–31)
MCV RBC AUTO: 102 FL (ref 78–98)
MONOCYTES # BLD AUTO: 0.4 THOU/UL (ref 0.11–0.59)
MONOCYTES NFR BLD AUTO: 3.2 % (ref 0–10)
NEUTROPHILS # BLD AUTO: 9.8 THOU/UL (ref 1.4–6.5)
NEUTROPHILS NFR BLD AUTO: 91 % (ref 42–75)
O2 A-A PPRESDIFF RESPIRATORY: 179.28 MMHG (ref 0–20)
PCO2 BLDA: 33.7 MMHG (ref 35–45)
PH BLDA: 7.41 [PH] (ref 7.35–7.45)
PLATELET # BLD AUTO: 171 THOU/UL (ref 130–400)
PO2 BLDA: 63.8 MMHG (ref 60–?)
POTASSIUM BLD-SCNC: 3.52 MMOL/L (ref 3.7–5.3)
POTASSIUM BLDV-SCNC: 2.87 MMOL/L (ref 3.7–5.3)
POTASSIUM SERPL-SCNC: 4 MMOL/L (ref 3.5–5.1)
PROT UR STRIP.AUTO-MCNC: 30 MG/DL
RBC # BLD AUTO: 2.73 MILL/UL (ref 4.7–6.1)
RBC UR QL AUTO: (no result) HPF (ref 0–3)
SALICYLATES SERPL-MCNC: (no result) MG/DL (ref 15–30)
SODIUM BLDV-SCNC: 134.3 MMOL/L (ref 133–146)
SODIUM SERPL-SCNC: 133 MMOL/L (ref 136–145)
SP GR UR STRIP: 1.02 (ref 1–1.04)
SPECIMEN DRAWN FROM PATIENT: (no result)
TROPONIN I SERPL DL<=0.01 NG/ML-MCNC: 0.07 NG/ML (ref ?–0.03)
TROPONIN I SERPL DL<=0.01 NG/ML-MCNC: 0.07 NG/ML (ref ?–0.03)
WBC # BLD AUTO: 10.8 THOU/UL (ref 4.8–10.8)
WBC UR QL AUTO: (no result) HPF (ref 0–3)

## 2022-06-18 PROCEDURE — 85025 COMPLETE CBC W/AUTO DIFF WBC: CPT

## 2022-06-18 PROCEDURE — 81015 MICROSCOPIC EXAM OF URINE: CPT

## 2022-06-18 PROCEDURE — 83735 ASSAY OF MAGNESIUM: CPT

## 2022-06-18 PROCEDURE — 87077 CULTURE AEROBIC IDENTIFY: CPT

## 2022-06-18 PROCEDURE — 84484 ASSAY OF TROPONIN QUANT: CPT

## 2022-06-18 PROCEDURE — 87081 CULTURE SCREEN ONLY: CPT

## 2022-06-18 PROCEDURE — 87070 CULTURE OTHR SPECIMN AEROBIC: CPT

## 2022-06-18 PROCEDURE — 93005 ELECTROCARDIOGRAM TRACING: CPT

## 2022-06-18 PROCEDURE — 80048 BASIC METABOLIC PNL TOTAL CA: CPT

## 2022-06-18 PROCEDURE — 83605 ASSAY OF LACTIC ACID: CPT

## 2022-06-18 PROCEDURE — 71250 CT THORAX DX C-: CPT

## 2022-06-18 PROCEDURE — 84100 ASSAY OF PHOSPHORUS: CPT

## 2022-06-18 PROCEDURE — 5A1945Z RESPIRATORY VENTILATION, 24-96 CONSECUTIVE HOURS: ICD-10-PCS | Performed by: EMERGENCY MEDICINE

## 2022-06-18 PROCEDURE — 81003 URINALYSIS AUTO W/O SCOPE: CPT

## 2022-06-18 PROCEDURE — B548ZZA ULTRASONOGRAPHY OF SUPERIOR VENA CAVA, GUIDANCE: ICD-10-PCS | Performed by: EMERGENCY MEDICINE

## 2022-06-18 PROCEDURE — 93970 EXTREMITY STUDY: CPT

## 2022-06-18 PROCEDURE — S0028 INJECTION, FAMOTIDINE, 20 MG: HCPCS

## 2022-06-18 PROCEDURE — 82805 BLOOD GASES W/O2 SATURATION: CPT

## 2022-06-18 PROCEDURE — 83935 ASSAY OF URINE OSMOLALITY: CPT

## 2022-06-18 PROCEDURE — 36415 COLL VENOUS BLD VENIPUNCTURE: CPT

## 2022-06-18 PROCEDURE — 0D9670Z DRAINAGE OF STOMACH WITH DRAINAGE DEVICE, VIA NATURAL OR ARTIFICIAL OPENING: ICD-10-PCS | Performed by: EMERGENCY MEDICINE

## 2022-06-18 PROCEDURE — 96365 THER/PROPH/DIAG IV INF INIT: CPT

## 2022-06-18 PROCEDURE — 31500 INSERT EMERGENCY AIRWAY: CPT

## 2022-06-18 PROCEDURE — 02HV33Z INSERTION OF INFUSION DEVICE INTO SUPERIOR VENA CAVA, PERCUTANEOUS APPROACH: ICD-10-PCS | Performed by: EMERGENCY MEDICINE

## 2022-06-18 PROCEDURE — 76770 US EXAM ABDO BACK WALL COMP: CPT

## 2022-06-18 PROCEDURE — 83930 ASSAY OF BLOOD OSMOLALITY: CPT

## 2022-06-18 PROCEDURE — 86901 BLOOD TYPING SEROLOGIC RH(D): CPT

## 2022-06-18 PROCEDURE — 85027 COMPLETE CBC AUTOMATED: CPT

## 2022-06-18 PROCEDURE — 36556 INSERT NON-TUNNEL CV CATH: CPT

## 2022-06-18 PROCEDURE — 94003 VENT MGMT INPAT SUBQ DAY: CPT

## 2022-06-18 PROCEDURE — 86850 RBC ANTIBODY SCREEN: CPT

## 2022-06-18 PROCEDURE — 80053 COMPREHEN METABOLIC PANEL: CPT

## 2022-06-18 PROCEDURE — 87205 SMEAR GRAM STAIN: CPT

## 2022-06-18 PROCEDURE — 85379 FIBRIN DEGRADATION QUANT: CPT

## 2022-06-18 PROCEDURE — 84443 ASSAY THYROID STIM HORMONE: CPT

## 2022-06-18 PROCEDURE — 96361 HYDRATE IV INFUSION ADD-ON: CPT

## 2022-06-18 PROCEDURE — 82553 CREATINE MB FRACTION: CPT

## 2022-06-18 PROCEDURE — 87186 SC STD MICRODIL/AGAR DIL: CPT

## 2022-06-18 PROCEDURE — 87149 DNA/RNA DIRECT PROBE: CPT

## 2022-06-18 PROCEDURE — 70450 CT HEAD/BRAIN W/O DYE: CPT

## 2022-06-18 PROCEDURE — 87040 BLOOD CULTURE FOR BACTERIA: CPT

## 2022-06-18 PROCEDURE — 51702 INSERT TEMP BLADDER CATH: CPT

## 2022-06-18 PROCEDURE — 84145 PROCALCITONIN (PCT): CPT

## 2022-06-18 PROCEDURE — 82533 TOTAL CORTISOL: CPT

## 2022-06-18 PROCEDURE — 80307 DRUG TEST PRSMV CHEM ANLYZR: CPT

## 2022-06-18 PROCEDURE — 80202 ASSAY OF VANCOMYCIN: CPT

## 2022-06-18 PROCEDURE — 93306 TTE W/DOPPLER COMPLETE: CPT

## 2022-06-18 PROCEDURE — 94002 VENT MGMT INPAT INIT DAY: CPT

## 2022-06-18 PROCEDURE — 0BH18EZ INSERTION OF ENDOTRACHEAL AIRWAY INTO TRACHEA, VIA NATURAL OR ARTIFICIAL OPENING ENDOSCOPIC: ICD-10-PCS | Performed by: EMERGENCY MEDICINE

## 2022-06-18 PROCEDURE — 71045 X-RAY EXAM CHEST 1 VIEW: CPT

## 2022-06-18 PROCEDURE — 36600 WITHDRAWAL OF ARTERIAL BLOOD: CPT

## 2022-06-18 PROCEDURE — 86900 BLOOD TYPING SEROLOGIC ABO: CPT

## 2022-06-18 PROCEDURE — 96375 TX/PRO/DX INJ NEW DRUG ADDON: CPT

## 2022-06-18 PROCEDURE — 36416 COLLJ CAPILLARY BLOOD SPEC: CPT

## 2022-06-18 PROCEDURE — 87086 URINE CULTURE/COLONY COUNT: CPT

## 2022-06-18 PROCEDURE — 3E033XZ INTRODUCTION OF VASOPRESSOR INTO PERIPHERAL VEIN, PERCUTANEOUS APPROACH: ICD-10-PCS | Performed by: EMERGENCY MEDICINE

## 2022-06-18 PROCEDURE — 3E03329 INTRODUCTION OF OTHER ANTI-INFECTIVE INTO PERIPHERAL VEIN, PERCUTANEOUS APPROACH: ICD-10-PCS | Performed by: EMERGENCY MEDICINE

## 2022-06-18 PROCEDURE — 80306 DRUG TEST PRSMV INSTRMNT: CPT

## 2022-06-18 RX ADMIN — FAMOTIDINE SCH MG: 10 INJECTION, SOLUTION INTRAVENOUS at 20:57

## 2022-06-18 RX ADMIN — HYDROCORTISONE SODIUM SUCCINATE SCH MG: 100 INJECTION, POWDER, FOR SOLUTION INTRAMUSCULAR; INTRAVENOUS at 18:19

## 2022-06-19 LAB
ANALYZER IN CARDIO: (no result)
ANION GAP SERPL CALC-SCNC: 13 MMOL/L (ref 10–20)
BASE EXCESS STD BLDA CALC-SCNC: -5 MEQ/L
BUN SERPL-MCNC: 50 MG/DL (ref 8.4–25.7)
CA-I BLDA-SCNC: 1.13 MMOL/L (ref 1.12–1.3)
CALCIUM SERPL-MCNC: 7.8 MG/DL (ref 7.8–10.44)
CHLORIDE SERPL-SCNC: 107 MMOL/L (ref 98–107)
CO2 SERPL-SCNC: 20 MMOL/L (ref 23–31)
CREAT CL PREDICTED SERPL C-G-VRATE: 25 ML/MIN (ref 70–130)
GLUCOSE SERPL-MCNC: 192 MG/DL (ref 83–110)
HCO3 BLDA-SCNC: 19.5 MEQ/L (ref 22–28)
HCT VFR BLDA CALC: 31 % (ref 42–52)
HGB BLD-MCNC: 10 G/DL (ref 14–18)
HGB BLDA-MCNC: 10.5 G/DL (ref 14–18)
MCH RBC QN AUTO: 31.2 PG (ref 27–31)
MCV RBC AUTO: 101 FL (ref 78–98)
O2 A-A PPRESDIFF RESPIRATORY: 153.78 MMHG (ref 0–20)
PCO2 BLDA: 34.1 MMHG (ref 35–45)
PH BLDA: 7.38 [PH] (ref 7.35–7.45)
PLATELET # BLD AUTO: 236 THOU/UL (ref 130–400)
PO2 BLDA: 88.8 MMHG (ref 60–?)
POTASSIUM BLD-SCNC: 3.99 MMOL/L (ref 3.7–5.3)
POTASSIUM SERPL-SCNC: 3.9 MMOL/L (ref 3.5–5.1)
RBC # BLD AUTO: 3.22 MILL/UL (ref 4.7–6.1)
SODIUM SERPL-SCNC: 136 MMOL/L (ref 136–145)
SPECIMEN DRAWN FROM PATIENT: (no result)
VANCOMYCIN SERPL-MCNC: 17.8 UG/ML
WBC # BLD AUTO: 14.6 THOU/UL (ref 4.8–10.8)

## 2022-06-19 RX ADMIN — HYDROCORTISONE SODIUM SUCCINATE SCH MG: 100 INJECTION, POWDER, FOR SOLUTION INTRAMUSCULAR; INTRAVENOUS at 12:49

## 2022-06-19 RX ADMIN — METRONIDAZOLE SCH MLS: 500 INJECTION, SOLUTION INTRAVENOUS at 15:11

## 2022-06-19 RX ADMIN — ASPIRIN SCH MG: 81 TABLET ORAL at 08:39

## 2022-06-19 RX ADMIN — METRONIDAZOLE SCH MLS: 500 INJECTION, SOLUTION INTRAVENOUS at 20:45

## 2022-06-19 RX ADMIN — FAMOTIDINE SCH MG: 10 INJECTION, SOLUTION INTRAVENOUS at 08:39

## 2022-06-19 RX ADMIN — HYDROCORTISONE SODIUM SUCCINATE SCH MG: 100 INJECTION, POWDER, FOR SOLUTION INTRAMUSCULAR; INTRAVENOUS at 00:03

## 2022-06-19 RX ADMIN — HYDROCORTISONE SODIUM SUCCINATE SCH MG: 100 INJECTION, POWDER, FOR SOLUTION INTRAMUSCULAR; INTRAVENOUS at 17:42

## 2022-06-19 RX ADMIN — HYDROCORTISONE SODIUM SUCCINATE SCH MG: 100 INJECTION, POWDER, FOR SOLUTION INTRAMUSCULAR; INTRAVENOUS at 23:01

## 2022-06-19 RX ADMIN — HYDROCORTISONE SODIUM SUCCINATE SCH MG: 100 INJECTION, POWDER, FOR SOLUTION INTRAMUSCULAR; INTRAVENOUS at 06:00

## 2022-06-20 VITALS — SYSTOLIC BLOOD PRESSURE: 105 MMHG | DIASTOLIC BLOOD PRESSURE: 66 MMHG

## 2022-06-20 LAB
ANION GAP SERPL CALC-SCNC: 13 MMOL/L (ref 10–20)
BUN SERPL-MCNC: 51 MG/DL (ref 8.4–25.7)
CALCIUM SERPL-MCNC: 7.7 MG/DL (ref 7.8–10.44)
CHLORIDE SERPL-SCNC: 108 MMOL/L (ref 98–107)
CO2 SERPL-SCNC: 18 MMOL/L (ref 23–31)
CREAT CL PREDICTED SERPL C-G-VRATE: 25 ML/MIN (ref 70–130)
GLUCOSE SERPL-MCNC: 149 MG/DL (ref 83–110)
HGB BLD-MCNC: 9 G/DL (ref 14–18)
MAGNESIUM SERPL-MCNC: 1.9 MG/DL (ref 1.6–2.6)
MCH RBC QN AUTO: 33 PG (ref 27–31)
MCV RBC AUTO: 101 FL (ref 78–98)
PLATELET # BLD AUTO: 200 THOU/UL (ref 130–400)
POTASSIUM SERPL-SCNC: 3.9 MMOL/L (ref 3.5–5.1)
RBC # BLD AUTO: 2.73 MILL/UL (ref 4.7–6.1)
SODIUM SERPL-SCNC: 135 MMOL/L (ref 136–145)
VANCOMYCIN SERPL-MCNC: 32.6 UG/ML
VANCOMYCIN SERPL-MCNC: 33.7 UG/ML
WBC # BLD AUTO: 12.8 THOU/UL (ref 4.8–10.8)

## 2022-06-20 RX ADMIN — HEPARIN SODIUM SCH UNITS: 5000 INJECTION, SOLUTION INTRAVENOUS; SUBCUTANEOUS at 21:08

## 2022-06-20 RX ADMIN — HYDROCORTISONE SODIUM SUCCINATE SCH MG: 100 INJECTION, POWDER, FOR SOLUTION INTRAMUSCULAR; INTRAVENOUS at 11:39

## 2022-06-20 RX ADMIN — FAMOTIDINE SCH MG: 10 INJECTION, SOLUTION INTRAVENOUS at 09:04

## 2022-06-20 RX ADMIN — METRONIDAZOLE SCH MLS: 500 INJECTION, SOLUTION INTRAVENOUS at 21:08

## 2022-06-20 RX ADMIN — ASPIRIN SCH MG: 81 TABLET ORAL at 09:04

## 2022-06-20 RX ADMIN — HEPARIN SODIUM SCH UNITS: 5000 INJECTION, SOLUTION INTRAVENOUS; SUBCUTANEOUS at 14:39

## 2022-06-20 RX ADMIN — METRONIDAZOLE SCH MLS: 500 INJECTION, SOLUTION INTRAVENOUS at 05:06

## 2022-06-20 RX ADMIN — HYDROCORTISONE SODIUM SUCCINATE SCH MG: 100 INJECTION, POWDER, FOR SOLUTION INTRAMUSCULAR; INTRAVENOUS at 06:20

## 2022-06-20 RX ADMIN — METRONIDAZOLE SCH MLS: 500 INJECTION, SOLUTION INTRAVENOUS at 13:33

## 2022-06-20 RX ADMIN — HYDROCORTISONE SODIUM SUCCINATE SCH MG: 100 INJECTION, POWDER, FOR SOLUTION INTRAMUSCULAR; INTRAVENOUS at 23:44

## 2022-06-20 RX ADMIN — HYDROCORTISONE SODIUM SUCCINATE SCH MG: 100 INJECTION, POWDER, FOR SOLUTION INTRAMUSCULAR; INTRAVENOUS at 17:30

## 2022-06-20 RX ADMIN — HEPARIN SODIUM SCH UNITS: 5000 INJECTION, SOLUTION INTRAVENOUS; SUBCUTANEOUS at 09:04

## 2022-06-21 VITALS — TEMPERATURE: 97.6 F

## 2022-06-21 LAB
ALBUMIN SERPL BCG-MCNC: 1.9 G/DL (ref 3.4–4.8)
ALP SERPL-CCNC: 69 U/L (ref 40–110)
ALT SERPL W P-5'-P-CCNC: 10 U/L (ref 8–55)
ANION GAP SERPL CALC-SCNC: 11 MMOL/L (ref 10–20)
AST SERPL-CCNC: 14 U/L (ref 5–34)
BASOPHILS # BLD AUTO: 0 THOU/UL (ref 0–0.2)
BASOPHILS NFR BLD AUTO: 0 % (ref 0–1)
BILIRUB SERPL-MCNC: 0.4 MG/DL (ref 0.2–1.2)
BUN SERPL-MCNC: 58 MG/DL (ref 8.4–25.7)
CALCIUM SERPL-MCNC: 7.6 MG/DL (ref 7.8–10.44)
CHLORIDE SERPL-SCNC: 107 MMOL/L (ref 98–107)
CO2 SERPL-SCNC: 19 MMOL/L (ref 23–31)
CREAT CL PREDICTED SERPL C-G-VRATE: 23 ML/MIN (ref 70–130)
EOSINOPHIL # BLD AUTO: 0 THOU/UL (ref 0–0.7)
EOSINOPHIL NFR BLD AUTO: 0.1 % (ref 0–10)
GLOBULIN SER CALC-MCNC: 3 G/DL (ref 2.4–3.5)
GLUCOSE SERPL-MCNC: 198 MG/DL (ref 83–110)
HGB BLD-MCNC: 8.1 G/DL (ref 14–18)
LYMPHOCYTES # BLD: 0.4 THOU/UL (ref 1.2–3.4)
LYMPHOCYTES NFR BLD AUTO: 4.9 % (ref 21–51)
MCH RBC QN AUTO: 31.7 PG (ref 27–31)
MCV RBC AUTO: 100 FL (ref 78–98)
MONOCYTES # BLD AUTO: 0.2 THOU/UL (ref 0.11–0.59)
MONOCYTES NFR BLD AUTO: 2.4 % (ref 0–10)
NEUTROPHILS # BLD AUTO: 7.2 THOU/UL (ref 1.4–6.5)
NEUTROPHILS NFR BLD AUTO: 92.6 % (ref 42–75)
PLATELET # BLD AUTO: 160 THOU/UL (ref 130–400)
POTASSIUM SERPL-SCNC: 3.9 MMOL/L (ref 3.5–5.1)
RBC # BLD AUTO: 2.54 MILL/UL (ref 4.7–6.1)
SODIUM SERPL-SCNC: 133 MMOL/L (ref 136–145)
WBC # BLD AUTO: 7.7 THOU/UL (ref 4.8–10.8)

## 2022-06-21 RX ADMIN — INSULIN LISPRO PRN UNIT: 100 INJECTION, SOLUTION INTRAVENOUS; SUBCUTANEOUS at 05:15

## 2022-06-21 RX ADMIN — METRONIDAZOLE SCH MLS: 500 INJECTION, SOLUTION INTRAVENOUS at 05:15

## 2022-06-21 RX ADMIN — FAMOTIDINE SCH MG: 10 INJECTION, SOLUTION INTRAVENOUS at 08:33

## 2022-06-21 RX ADMIN — ASPIRIN SCH MG: 81 TABLET ORAL at 08:33

## 2022-06-21 RX ADMIN — HYDROCORTISONE SODIUM SUCCINATE SCH MG: 100 INJECTION, POWDER, FOR SOLUTION INTRAMUSCULAR; INTRAVENOUS at 05:15

## 2022-06-21 RX ADMIN — INSULIN LISPRO PRN UNIT: 100 INJECTION, SOLUTION INTRAVENOUS; SUBCUTANEOUS at 09:44

## 2022-06-21 RX ADMIN — HEPARIN SODIUM SCH UNITS: 5000 INJECTION, SOLUTION INTRAVENOUS; SUBCUTANEOUS at 08:33
